# Patient Record
Sex: FEMALE | Race: WHITE | NOT HISPANIC OR LATINO
[De-identification: names, ages, dates, MRNs, and addresses within clinical notes are randomized per-mention and may not be internally consistent; named-entity substitution may affect disease eponyms.]

---

## 2018-07-17 ENCOUNTER — APPOINTMENT (OUTPATIENT)
Dept: SURGICAL ONCOLOGY | Facility: CLINIC | Age: 43
End: 2018-07-17
Payer: MEDICAID

## 2018-07-17 VITALS
SYSTOLIC BLOOD PRESSURE: 94 MMHG | DIASTOLIC BLOOD PRESSURE: 61 MMHG | OXYGEN SATURATION: 100 % | HEART RATE: 76 BPM | BODY MASS INDEX: 20.89 KG/M2 | RESPIRATION RATE: 16 BRPM | WEIGHT: 130 LBS | HEIGHT: 66 IN

## 2018-07-17 DIAGNOSIS — Z86.69 PERSONAL HISTORY OF OTHER DISEASES OF THE NERVOUS SYSTEM AND SENSE ORGANS: ICD-10-CM

## 2018-07-17 DIAGNOSIS — Z86.79 PERSONAL HISTORY OF OTHER DISEASES OF THE CIRCULATORY SYSTEM: ICD-10-CM

## 2018-07-17 DIAGNOSIS — N60.09 SOLITARY CYST OF UNSPECIFIED BREAST: ICD-10-CM

## 2018-07-17 PROCEDURE — 99203 OFFICE O/P NEW LOW 30 MIN: CPT

## 2019-02-27 ENCOUNTER — APPOINTMENT (OUTPATIENT)
Dept: UROGYNECOLOGY | Facility: CLINIC | Age: 44
End: 2019-02-27
Payer: MEDICAID

## 2019-02-27 VITALS
WEIGHT: 130 LBS | HEART RATE: 88 BPM | BODY MASS INDEX: 20.89 KG/M2 | SYSTOLIC BLOOD PRESSURE: 104 MMHG | DIASTOLIC BLOOD PRESSURE: 67 MMHG | HEIGHT: 66 IN

## 2019-02-27 DIAGNOSIS — N36.41 HYPERMOBILITY OF URETHRA: ICD-10-CM

## 2019-02-27 DIAGNOSIS — N39.46 MIXED INCONTINENCE: ICD-10-CM

## 2019-02-27 DIAGNOSIS — R35.0 FREQUENCY OF MICTURITION: ICD-10-CM

## 2019-02-27 LAB
BILIRUB UR QL STRIP: NORMAL
CLARITY UR: CLEAR
COLLECTION METHOD: NORMAL
GLUCOSE UR-MCNC: NORMAL
HCG UR QL: 1 EU/DL
HGB UR QL STRIP.AUTO: NORMAL
KETONES UR-MCNC: 40
LEUKOCYTE ESTERASE UR QL STRIP: NORMAL
NITRITE UR QL STRIP: NORMAL
PH UR STRIP: 7
PROT UR STRIP-MCNC: 30
SP GR UR STRIP: 1.02

## 2019-02-27 PROCEDURE — 51701 INSERT BLADDER CATHETER: CPT

## 2019-02-27 PROCEDURE — 99204 OFFICE O/P NEW MOD 45 MIN: CPT | Mod: 25

## 2019-02-27 NOTE — DISCUSSION/SUMMARY
[Discuss Alternatives/Risks/Benefits w/Patient] : All alternatives, risks, and benefits were discussed with the patient/family and all questions were answered.  Patient expressed good understanding and appreciates the importance of follow up as recommended. [FreeTextEntry1] : 44yo with frequency, FLAVIA, and UUI. Wearing 2 liners/day and 1 at night. She is interested in treating both her frequency and FLAVIA, however, she would like to start with frequency. She was counseled on the diagnosis of OAB and management options, which include observation, lifestyle management and bladder training, medications, and procedures. She was counseled on the diagnosis of FLAVIA and management options including observation, PFMT, Intravaginal devices such as Impressa, and surgery. Due to her mixed complaints, we recommended she undergo UDS testing and fill out a bladder diary. She will then return for discussion of results and to discuss management options. [facility] handouts on OAB ans FLAVIA given. All questions were answered, and she was in agreement with the plan.

## 2019-02-27 NOTE — HISTORY OF PRESENT ILLNESS
[Cystocele (Obstetric)] : none [Rectal Prolapse] : none [Stress Incontinence] : frequent [Unable To Restrain Bowel Movement] : frequent [Urinary Frequency] : none [Feelings Of Urinary Urgency] : daily [x1] : once nightly [Urinary Tract Infection] : daily [] : years ago [Hematuria] : none [Constipation Obstructed Defecation] : none [Stool Visible Blood] : none [Incomplete Emptying Of Stool] : none [de-identified] : with movement during intercourse - leaks large amounts  [de-identified] : 3 [de-identified] : exercise, movement - wears 2 liners/day [de-identified] : 3 [de-identified] : full bladder [de-identified] : 3 [de-identified] : 20x/day [de-identified] : 3 [de-identified] : 3 [de-identified] : 3 [FreeTextEntry1] : 44yo presenting for evaluation for urinary frequency and ELY. Reports frequency is most bothersome. Urinates every 30min-1hr during the day up to 20x/day. Awakes 1-2x/night. Reports FLAVIA is also bothersome as she cannot exercise due to leakage of urine.\par \par PMH: heart murmur - follows with cardiology, not on meds\par PSH: open appendectomy\par Meds: none\par \par Intake: \par 2L H2O\par 4-5c coffee\par Kombucha tea

## 2019-02-27 NOTE — PROCEDURE
[FreeTextEntry1] : Due to history of UUI and FLAVIA, a straight catheterization was performed to assess for infection. The urethral meatus was prepped with betadine. Straight catheter inserted. PVR 5 cc obtained. Pt tolerated well.

## 2019-02-27 NOTE — PHYSICAL EXAM
[No Acute Distress] : in no acute distress [Well developed] : well developed [Well Nourished] : ~L well nourished [Good Hygeine] : demonstrates good hygeine [Oriented x3] : oriented to person, place, and time [Normal Mood/Affect] : mood and affect are normal [Bulbocavernous] : bulbocavernous was present [Anal Reflex] : the anal reflex was present [Warm and Dry] : was warm and dry to touch [Normal Gait] : gait was normal [Labia Majora] : were normal [Labia Minora] : were normal [Normal Appearance] : general appearance was normal [No Bleeding] : there was no active vaginal bleeding [2] : 2 [Uterine Adnexae] : were not tender and not enlarged [Normal] : no abnormalities [Exam Deferred] : was deferred [Anxiety] : patient is not anxious [Tenderness] : ~T no ~M abdominal tenderness observed [Distended] : not distended [H/Smegaly] : no hepatosplenomegaly [Inguinal LAD] : no adenopathy was noted in the inguinal lymph nodes [FreeTextEntry3] : hypermobile  [de-identified] : no prolapse seen

## 2019-03-01 ENCOUNTER — RESULT REVIEW (OUTPATIENT)
Age: 44
End: 2019-03-01

## 2019-03-18 ENCOUNTER — OUTPATIENT (OUTPATIENT)
Dept: OUTPATIENT SERVICES | Facility: HOSPITAL | Age: 44
LOS: 1 days | End: 2019-03-18
Payer: COMMERCIAL

## 2019-03-18 ENCOUNTER — RESULT CHARGE (OUTPATIENT)
Age: 44
End: 2019-03-18

## 2019-03-18 ENCOUNTER — APPOINTMENT (OUTPATIENT)
Dept: UROGYNECOLOGY | Facility: CLINIC | Age: 44
End: 2019-03-18
Payer: MEDICAID

## 2019-03-18 DIAGNOSIS — Z01.818 ENCOUNTER FOR OTHER PREPROCEDURAL EXAMINATION: ICD-10-CM

## 2019-03-18 LAB
BILIRUB UR QL STRIP: NORMAL
CLARITY UR: CLEAR
COLLECTION METHOD: NORMAL
GLUCOSE UR-MCNC: NORMAL
HCG UR QL: 0.2 EU/DL
HGB UR QL STRIP.AUTO: NORMAL
KETONES UR-MCNC: NORMAL
LEUKOCYTE ESTERASE UR QL STRIP: NORMAL
NITRITE UR QL STRIP: NORMAL
PH UR STRIP: 7.5
PROT UR STRIP-MCNC: NORMAL
SP GR UR STRIP: 1.01

## 2019-03-18 PROCEDURE — 51784 ANAL/URINARY MUSCLE STUDY: CPT

## 2019-03-18 PROCEDURE — 51797 INTRAABDOMINAL PRESSURE TEST: CPT | Mod: 26

## 2019-03-18 PROCEDURE — 51797 INTRAABDOMINAL PRESSURE TEST: CPT

## 2019-03-18 PROCEDURE — 51729 CYSTOMETROGRAM W/VP&UP: CPT | Mod: 26

## 2019-03-18 PROCEDURE — 51784 ANAL/URINARY MUSCLE STUDY: CPT | Mod: 26

## 2019-03-18 PROCEDURE — 51729 CYSTOMETROGRAM W/VP&UP: CPT

## 2019-03-19 DIAGNOSIS — N32.81 OVERACTIVE BLADDER: ICD-10-CM

## 2019-03-19 DIAGNOSIS — N39.3 STRESS INCONTINENCE (FEMALE) (MALE): ICD-10-CM

## 2019-03-20 ENCOUNTER — APPOINTMENT (OUTPATIENT)
Dept: UROGYNECOLOGY | Facility: CLINIC | Age: 44
End: 2019-03-20

## 2019-05-15 ENCOUNTER — APPOINTMENT (OUTPATIENT)
Dept: UROGYNECOLOGY | Facility: CLINIC | Age: 44
End: 2019-05-15
Payer: MEDICAID

## 2019-05-15 PROCEDURE — 99213 OFFICE O/P EST LOW 20 MIN: CPT

## 2019-05-15 NOTE — HISTORY OF PRESENT ILLNESS
[FreeTextEntry1] : Returns today for followup on her urinary incontinence. Her chart was reviewed. Review of symptoms unchanged from initial visit. On initial visit she had complaints of urgency with incontinence as well as urinary frequency. Her chart was reviewed. 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 A reviewed these findings with her. She stated that her to dominant urinary complaint today is the urge incontinence and urinary frequency and we discussed treating the detrusor instability and overactive bladder first. We discussed treatment options and she would like to time medication. Side effects were discussed. We also discussed behavioral modification techniques. I've asked to followup in the office in approximately one month to evaluate her response. She did not bring with her her urologic as she stated she measured on shabbat and didn't record the values but I asked her to bring a urologic with her on her next visit in order to assess how she is doing on the oxybutynin. All questions were answered. IUGA pt info on Kegels given

## 2019-06-17 ENCOUNTER — APPOINTMENT (OUTPATIENT)
Dept: UROGYNECOLOGY | Facility: CLINIC | Age: 44
End: 2019-06-17
Payer: MEDICAID

## 2019-06-17 VITALS — DIASTOLIC BLOOD PRESSURE: 80 MMHG | HEART RATE: 84 BPM | SYSTOLIC BLOOD PRESSURE: 117 MMHG

## 2019-06-17 DIAGNOSIS — N32.81 OVERACTIVE BLADDER: ICD-10-CM

## 2019-06-17 DIAGNOSIS — N39.3 STRESS INCONTINENCE (FEMALE) (MALE): ICD-10-CM

## 2019-06-17 PROCEDURE — 99213 OFFICE O/P EST LOW 20 MIN: CPT

## 2019-06-17 RX ORDER — OXYBUTYNIN CHLORIDE 10 MG/1
10 TABLET, EXTENDED RELEASE ORAL
Qty: 30 | Refills: 1 | Status: DISCONTINUED | COMMUNITY
Start: 2019-05-15 | End: 2019-06-17

## 2019-06-17 NOTE — HISTORY OF PRESENT ILLNESS
[Urge Incontinence Of Urine] : none [Stress Incontinence] : none [Urinary Frequency] : none [Unable To Restrain Bowel Movement] : frequent [de-identified] : sometimes [de-identified] : wears a panty liner [de-identified] : 0-1 [FreeTextEntry1] : Pt took Oxybutynin and it caused nausea, however it did help with  urge incontinence. Her chart was reviewed.  Discussed tx options pt will try another medication.\par Will start Myrbetriq.  SE discussed. F/u 1 month\par All questions answered. \par

## 2019-06-18 RX ORDER — TROSPIUM CHLORIDE 60 MG/1
60 CAPSULE, EXTENDED RELEASE ORAL DAILY
Qty: 30 | Refills: 2 | Status: ACTIVE | COMMUNITY
Start: 2019-06-18 | End: 1900-01-01

## 2019-06-18 RX ORDER — MIRABEGRON 50 MG/1
50 TABLET, FILM COATED, EXTENDED RELEASE ORAL
Qty: 30 | Refills: 1 | Status: DISCONTINUED | COMMUNITY
Start: 2019-06-17 | End: 2019-06-18

## 2019-06-19 ENCOUNTER — MESSAGE (OUTPATIENT)
Age: 44
End: 2019-06-19

## 2019-07-15 ENCOUNTER — APPOINTMENT (OUTPATIENT)
Dept: UROGYNECOLOGY | Facility: CLINIC | Age: 44
End: 2019-07-15

## 2019-11-21 LAB
APPEARANCE: CLEAR
BACTERIA UR CULT: NORMAL
BACTERIA: NEGATIVE
BILIRUBIN URINE: NEGATIVE
BLOOD URINE: NORMAL
COLOR: YELLOW
GLUCOSE QUALITATIVE U: NEGATIVE
HYALINE CASTS: 0 /LPF
KETONES URINE: NORMAL
LEUKOCYTE ESTERASE URINE: NEGATIVE
MICROSCOPIC-UA: NORMAL
NITRITE URINE: NEGATIVE
PH URINE: 6.5
PROTEIN URINE: ABNORMAL
RED BLOOD CELLS URINE: 4 /HPF
SPECIFIC GRAVITY URINE: 1.03
SQUAMOUS EPITHELIAL CELLS: 7 /HPF
UROBILINOGEN URINE: NORMAL
WHITE BLOOD CELLS URINE: 5 /HPF

## 2020-05-18 ENCOUNTER — LABORATORY RESULT (OUTPATIENT)
Age: 45
End: 2020-05-18

## 2020-05-18 ENCOUNTER — APPOINTMENT (OUTPATIENT)
Dept: DERMATOLOGY | Facility: CLINIC | Age: 45
End: 2020-05-18
Payer: MEDICAID

## 2020-05-18 ENCOUNTER — APPOINTMENT (OUTPATIENT)
Dept: DERMATOLOGY | Facility: CLINIC | Age: 45
End: 2020-05-18

## 2020-05-18 VITALS — BODY MASS INDEX: 22.5 KG/M2 | HEIGHT: 66 IN | WEIGHT: 140 LBS

## 2020-05-18 DIAGNOSIS — L85.3 XEROSIS CUTIS: ICD-10-CM

## 2020-05-18 DIAGNOSIS — D18.00 HEMANGIOMA UNSPECIFIED SITE: ICD-10-CM

## 2020-05-18 DIAGNOSIS — Z12.83 ENCOUNTER FOR SCREENING FOR MALIGNANT NEOPLASM OF SKIN: ICD-10-CM

## 2020-05-18 PROCEDURE — 99203 OFFICE O/P NEW LOW 30 MIN: CPT | Mod: GC,25

## 2020-05-18 PROCEDURE — 11102 TANGNTL BX SKIN SINGLE LES: CPT | Mod: GC

## 2020-05-18 RX ORDER — TRIAMCINOLONE ACETONIDE 1 MG/G
0.1 OINTMENT TOPICAL TWICE DAILY
Qty: 1 | Refills: 0 | Status: ACTIVE | COMMUNITY
Start: 2020-05-18 | End: 1900-01-01

## 2020-05-27 ENCOUNTER — EMERGENCY (EMERGENCY)
Facility: HOSPITAL | Age: 45
LOS: 1 days | Discharge: HOME | End: 2020-05-27
Attending: EMERGENCY MEDICINE
Payer: MEDICAID

## 2020-05-27 VITALS
OXYGEN SATURATION: 100 % | RESPIRATION RATE: 17 BRPM | HEART RATE: 73 BPM | SYSTOLIC BLOOD PRESSURE: 115 MMHG | DIASTOLIC BLOOD PRESSURE: 79 MMHG | TEMPERATURE: 99 F

## 2020-05-27 VITALS
WEIGHT: 149.91 LBS | SYSTOLIC BLOOD PRESSURE: 108 MMHG | RESPIRATION RATE: 17 BRPM | DIASTOLIC BLOOD PRESSURE: 69 MMHG | HEIGHT: 66 IN | OXYGEN SATURATION: 98 % | TEMPERATURE: 98 F | HEART RATE: 88 BPM

## 2020-05-27 DIAGNOSIS — T17.208A UNSPECIFIED FOREIGN BODY IN PHARYNX CAUSING OTHER INJURY, INITIAL ENCOUNTER: ICD-10-CM

## 2020-05-27 PROCEDURE — 99284 EMERGENCY DEPT VISIT MOD MDM: CPT

## 2020-05-27 PROCEDURE — 70360 X-RAY EXAM OF NECK: CPT | Mod: 26

## 2020-05-27 PROCEDURE — 70360 X-RAY EXAM OF NECK: CPT

## 2020-05-27 PROCEDURE — ZZZZZ: CPT

## 2020-05-27 PROCEDURE — 99285 EMERGENCY DEPT VISIT HI MDM: CPT | Mod: 25

## 2020-05-27 PROCEDURE — 31575 DIAGNOSTIC LARYNGOSCOPY: CPT

## 2020-05-27 NOTE — ED PROVIDER NOTE - OBJECTIVE STATEMENT
47y F no PMHx was eating fish 2hr ago and got bone stuck in throat and now has pain. Has been coughing, trying to cough up bone unsuccessfully. No sob, n/v. Has pain when swallowing, and odynophagia.

## 2020-05-27 NOTE — CONSULT NOTE ADULT - ASSESSMENT
47y F no PMHx was eating fish 2hr ago and got bone stuck in throat and now has pain. Has been coughing, trying to cough up bone unsuccessfully. No sob, n/v. Has pain when swallowing, and odynophagia. ENT consulted for foreign body sensation and upper airway evaluation. Laryngoscopy done, bone noted at the base of tongue. Removed with at bedside with ED attending.

## 2020-05-27 NOTE — ED PROVIDER NOTE - PROGRESS NOTE DETAILS
Luiz York, PGY 2: ENT at bedside using alligator and nasal scope, 3cm bone was removed from the base of the tongue Luiz York, PGY 2: ENT at bedside using alligator and nasal scope, 3cm bone was removed from the base of the tongue.  See procedure note for details.

## 2020-05-27 NOTE — ED PROVIDER NOTE - PHYSICAL EXAMINATION
GEN: No fever, no chills  HEENT: No bleeding, no foreign body  CHEST/LUNGS: Non-tachypneic, CTAB, bilateral breath sounds  CARDIAC: Non-tachycardic, normal perfusion  ABDOMEN: No abd pain, no crepitus\  MSK: no midline tenderness

## 2020-05-27 NOTE — ED ADULT NURSE NOTE - NSIMPLEMENTINTERV_GEN_ALL_ED
17-Sep-2018 19:31 Implemented All Universal Safety Interventions:  [place] to call system. Call bell, personal items and telephone within reach. Instruct patient to call for assistance. Room bathroom lighting operational. Non-slip footwear when patient is off stretcher. Physically safe environment: no spills, clutter or unnecessary equipment. Stretcher in lowest position, wheels locked, appropriate side rails in place.

## 2020-05-27 NOTE — CONSULT NOTE ADULT - SUBJECTIVE AND OBJECTIVE BOX
CC: foreign body     HPI: 47y F no PMHx was eating fish 2hr ago and got bone stuck in throat and now has pain. Has been coughing, trying to cough up bone unsuccessfully. No sob, n/v. Has pain when swallowing, and odynophagia. ENT consulted for foreign body sensation and upper airway evaluation.     PAST MEDICAL & SURGICAL HISTORY:    Allergies    No Known Allergies    Intolerances      MEDICATIONS  (STANDING):    MEDICATIONS  (PRN):      Social History: nonsmoker     Family history: no pertinent family history     ROS:   ENT: all negative except as noted in HPI   CV: denies palpitations  Pulm: denies SOB, cough, hemoptysis  GI: denies change in apetite, indigestion, n/v  : denies pertinent urinary symptoms, urgency  Neuro: denies numbness/tingling, loss of sensation  Psych: denies anxiety  MS: denies muscle weakness, instability  Heme: denies easy bruising or bleeding  Endo: denies heat/cold intolerance, excessive sweating  Vascular: denies LE edema    Vital Signs Last 24 Hrs  T(C): 36.7 (27 May 2020 19:05), Max: 36.7 (27 May 2020 16:54)  T(F): 98 (27 May 2020 19:05), Max: 98.1 (27 May 2020 16:54)  HR: 80 (27 May 2020 19:05) (80 - 88)  BP: 112/74 (27 May 2020 19:05) (108/69 - 112/74)  BP(mean): --  RR: 18 (27 May 2020 19:05) (17 - 18)  SpO2: 97% (27 May 2020 19:05) (97% - 98%)              PHYSICAL EXAM:  Gen: NAD  Skin: No rashes, bruises, or lesions  Head: Normocephalic, Atraumatic  Face: no edema, erythema, or fluctuance. Parotid glands soft without mass  Eyes: no scleral injection  Nose: Nares bilaterally patent, no discharge  Mouth: No Stridor / Drooling / Trismus.  Mucosa moist, tongue/uvula midline, oropharynx clear  Neck: Flat, supple, no lymphadenopathy, trachea midline, no masses  Lymphatic: No lymphadenopathy  Resp: breathing easily, no stridor  CV: no peripheral edema/cyanosis  GI: nondistended   Peripheral vascular: no JVD or edema  Neuro: facial nerve intact, no facial droop      Fiberoptic Indirect laryngoscopy:  (Scope #2 used): Reason for Laryngoscopy:      Nasopharynx, oropharynx, and hypopharynx clear, no bleeding. Posterior pharyngeal wall, vallecula, epiglottis, and subglottis appear normal. No erythema, edema, pooling of secretions, masses or lesions. Airway patent, no foreign body visualized. No glottic/supraglottic edema. True vocal cords, arytenoids, vestibular folds, ventricles, pyriform sinuses, and aryepiglottic folds appear normal bilaterally. Vocal cords mobile with good contact b/l. BASE OF TONGUE WITH FISHBONE               IMAGING/ADDITIONAL STUDIES:

## 2020-05-27 NOTE — ED ADULT NURSE NOTE - OBJECTIVE STATEMENT
Patient was eating fish and feels like the bone is stuck in her throat. Patient was eating at about 1600. No acute respiratory distress at this time.

## 2020-05-27 NOTE — ED PROVIDER NOTE - CLINICAL SUMMARY MEDICAL DECISION MAKING FREE TEXT BOX
Luiz York): Possible foreign objet after eating fish today. Concerning for bone in throat. Will get XR. No sign of airway compromise and no active hematemesis or hemoptysis Luiz York): Possible foreign objet after eating fish today. Concerning for bone in throat. Will get XR. No sign of airway compromise and no active hematemesis or hemoptysis    Attending Statement: Agree with the above.  Likely FB (fishbone) in oropharynx.  Exam as above; no evidence of or concerns for impending airway compromise.  Screening XR for pneumomediastinum, ENT for assistance c retrieval.  Reassess.  --BMM

## 2020-05-27 NOTE — ED PROVIDER NOTE - PATIENT PORTAL LINK FT
You can access the FollowMyHealth Patient Portal offered by [facility] by registering at the following website: [email or web address]. By joining [facility]’s FollowMyHealth portal, you will also be able to view your health information using other applications (apps) compatible with our system.

## 2020-05-27 NOTE — ED PROVIDER NOTE - NSFOLLOWUPINSTRUCTIONS_ED_ALL_ED_FT
Thank you for visiting our Emergency Department, it has been a pleasure taking part in your healthcare. Please follow up with your primary doctor within x48 hours.    Your discharge diagnosis is: fish bone in the throat   please take over the counter pain medication such as motrin (600mg every 6hours) and tylenol (650mg every 4hours) for pain and follow up with your primary care doctor.     Return precautions to the Emergency Department include but are not limited to: unrelenting nausea, vomiting, fever, chills, chest pain, shortness of breath, dizziness, abdominal pain, worsening pain, syncope, blood in urine or stool, headache that doesn't resolve, numbness or tingling, loss of sensation, loss of motor function, or any other concerning symptoms.

## 2020-05-27 NOTE — ED PROVIDER NOTE - ATTESTATION, MLM
I have reviewed and confirmed nurses' notes for patient's medications, allergies, medical history, and surgical history.
no chest pain and no edema.

## 2020-11-16 ENCOUNTER — LABORATORY RESULT (OUTPATIENT)
Age: 45
End: 2020-11-16

## 2020-11-16 ENCOUNTER — APPOINTMENT (OUTPATIENT)
Dept: DERMATOLOGY | Facility: CLINIC | Age: 45
End: 2020-11-16
Payer: MEDICAID

## 2020-11-16 VITALS — WEIGHT: 130 LBS | BODY MASS INDEX: 20.89 KG/M2 | HEIGHT: 66 IN

## 2020-11-16 DIAGNOSIS — Z12.83 ENCOUNTER FOR SCREENING FOR MALIGNANT NEOPLASM OF SKIN: ICD-10-CM

## 2020-11-16 DIAGNOSIS — Z86.03 PERSONAL HISTORY OF NEOPLASM OF UNCERTAIN BEHAVIOR: ICD-10-CM

## 2020-11-16 DIAGNOSIS — D48.5 NEOPLASM OF UNCERTAIN BEHAVIOR OF SKIN: ICD-10-CM

## 2020-11-16 DIAGNOSIS — D23.9 OTHER BENIGN NEOPLASM OF SKIN, UNSPECIFIED: ICD-10-CM

## 2020-11-16 DIAGNOSIS — Z86.018 PERSONAL HISTORY OF OTHER BENIGN NEOPLASM: ICD-10-CM

## 2020-11-16 PROCEDURE — 99213 OFFICE O/P EST LOW 20 MIN: CPT | Mod: 25

## 2020-11-16 PROCEDURE — 99072 ADDL SUPL MATRL&STAF TM PHE: CPT

## 2020-11-16 PROCEDURE — 11102 TANGNTL BX SKIN SINGLE LES: CPT

## 2020-11-19 ENCOUNTER — NON-APPOINTMENT (OUTPATIENT)
Age: 45
End: 2020-11-19

## 2021-12-15 PROBLEM — Z78.9 OTHER SPECIFIED HEALTH STATUS: Chronic | Status: ACTIVE | Noted: 2020-05-27

## 2022-01-05 ENCOUNTER — APPOINTMENT (OUTPATIENT)
Dept: UROGYNECOLOGY | Facility: CLINIC | Age: 47
End: 2022-01-05

## 2023-04-17 ENCOUNTER — APPOINTMENT (OUTPATIENT)
Dept: DERMATOLOGY | Facility: CLINIC | Age: 48
End: 2023-04-17

## 2023-04-25 ENCOUNTER — NON-APPOINTMENT (OUTPATIENT)
Age: 48
End: 2023-04-25

## 2023-04-25 ENCOUNTER — APPOINTMENT (OUTPATIENT)
Dept: DERMATOLOGY | Facility: CLINIC | Age: 48
End: 2023-04-25

## 2023-05-17 ENCOUNTER — APPOINTMENT (OUTPATIENT)
Dept: DERMATOLOGY | Facility: CLINIC | Age: 48
End: 2023-05-17

## 2023-05-24 ENCOUNTER — APPOINTMENT (OUTPATIENT)
Dept: OTOLARYNGOLOGY | Facility: CLINIC | Age: 48
End: 2023-05-24

## 2023-08-29 ENCOUNTER — APPOINTMENT (OUTPATIENT)
Dept: DERMATOLOGY | Facility: CLINIC | Age: 48
End: 2023-08-29
Payer: MEDICAID

## 2023-08-29 DIAGNOSIS — Q82.5 CONGENITAL NON-NEOPLASTIC NEVUS: ICD-10-CM

## 2023-08-29 DIAGNOSIS — D22.9 MELANOCYTIC NEVI, UNSPECIFIED: ICD-10-CM

## 2023-08-29 DIAGNOSIS — D18.01 HEMANGIOMA OF SKIN AND SUBCUTANEOUS TISSUE: ICD-10-CM

## 2023-08-29 DIAGNOSIS — L81.4 OTHER MELANIN HYPERPIGMENTATION: ICD-10-CM

## 2023-08-29 DIAGNOSIS — D23.9 OTHER BENIGN NEOPLASM OF SKIN, UNSPECIFIED: ICD-10-CM

## 2023-08-29 PROCEDURE — 99213 OFFICE O/P EST LOW 20 MIN: CPT

## 2023-08-30 NOTE — PHYSICAL EXAM
[Alert] : alert [Well Nourished] : well nourished [Full Body Skin Exam Performed] : performed [FreeTextEntry3] : deferred groin exam scattered red papules on trunk and extremities Fairly uniform and regular brown macules and papules on the trunk and extremities light brown macules scattered on trunk and extremities medial to R superior eyelid, skin colored papule R cheek with skin colored papule and central pigment R nipple with melanocytic macule, uniform in size, shape, and color L lateral thigh with firm nodule with overlying pigment

## 2023-08-30 NOTE — HISTORY OF PRESENT ILLNESS
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 not sure, still alive - goes into sun a lot, wears sunscreen when in sun

## 2023-08-30 NOTE — ASSESSMENT
[FreeTextEntry1] : #Nevi, compound near R eye, melanocytic nevus near R nipple #Dermatofibroma, L thigh #Cherry angioma #Solar lentigines - Counseled about clinically benign lesions - Discussed regular OTC sunscreen use, SPF 30 or higher   Skin cancer Screening - No lesions clinically concerning for malignancy today - Discussed regular self skin checks and ABCDEs of skin cancer screening - Discussed regular sunscreen use - Pt instructed to report any new or changing lesions  RTC 1 yr for FBSE or sooner if any concerns

## 2024-07-17 ENCOUNTER — APPOINTMENT (OUTPATIENT)
Dept: OTOLARYNGOLOGY | Facility: CLINIC | Age: 49
End: 2024-07-17
Payer: MEDICAID

## 2024-07-17 VITALS
HEART RATE: 85 BPM | HEIGHT: 66 IN | DIASTOLIC BLOOD PRESSURE: 84 MMHG | SYSTOLIC BLOOD PRESSURE: 116 MMHG | WEIGHT: 140 LBS | BODY MASS INDEX: 22.5 KG/M2

## 2024-07-17 DIAGNOSIS — J32.9 CHRONIC SINUSITIS, UNSPECIFIED: ICD-10-CM

## 2024-07-17 DIAGNOSIS — J30.9 ALLERGIC RHINITIS, UNSPECIFIED: ICD-10-CM

## 2024-07-17 PROCEDURE — 99244 OFF/OP CNSLTJ NEW/EST MOD 40: CPT

## 2024-07-22 ENCOUNTER — APPOINTMENT (OUTPATIENT)
Dept: CT IMAGING | Facility: IMAGING CENTER | Age: 49
End: 2024-07-22
Payer: MEDICAID

## 2024-07-22 PROCEDURE — 70486 CT MAXILLOFACIAL W/O DYE: CPT | Mod: 26

## 2024-07-25 ENCOUNTER — TRANSCRIPTION ENCOUNTER (OUTPATIENT)
Age: 49
End: 2024-07-25

## 2024-07-25 ENCOUNTER — NON-APPOINTMENT (OUTPATIENT)
Age: 49
End: 2024-07-25

## 2024-07-25 ENCOUNTER — APPOINTMENT (OUTPATIENT)
Dept: ALLERGY | Facility: CLINIC | Age: 49
End: 2024-07-25
Payer: MEDICAID

## 2024-07-25 VITALS
TEMPERATURE: 98.3 F | BODY MASS INDEX: 22.5 KG/M2 | SYSTOLIC BLOOD PRESSURE: 114 MMHG | OXYGEN SATURATION: 98 % | HEIGHT: 66 IN | WEIGHT: 140 LBS | DIASTOLIC BLOOD PRESSURE: 72 MMHG | HEART RATE: 90 BPM

## 2024-07-25 PROCEDURE — 99204 OFFICE O/P NEW MOD 45 MIN: CPT | Mod: 25

## 2024-07-25 PROCEDURE — 95004 PERQ TESTS W/ALRGNC XTRCS: CPT

## 2024-07-25 NOTE — SOCIAL HISTORY
[House] : [unfilled] lives in a house  [Central] : air conditioning provided by central unit [Radiator/Baseboard] : heating provided by radiator(s)/baseboard(s) [Dog] : dog [Cat] : cat [] :  [Smokers in Household] : there are smokers in the home [FreeTextEntry1] : Homemaker Lives with spouse and 3 children  [Bedroom] : not in the bedroom [Basement] : not in the basement [Living Area] : not in the living area [de-identified] :  sometimes in the house

## 2024-07-25 NOTE — PHYSICAL EXAM
[Alert] : alert [Well Nourished] : well nourished [Healthy Appearance] : healthy appearance [No Acute Distress] : no acute distress [Well Developed] : well developed [Normal Voice/Communication] : normal voice communication [Normal Nasal Mucosa] : the nasal mucosa was normal [No Neck Mass] : no neck mass was observed [No LAD] : no lymphadenopathy [No Thyroid Mass] : no thyroid mass [Normal Rate and Effort] : normal respiratory rhythm and effort [No Crackles] : no crackles [No Retractions] : no retractions [Normal Rate] : heart rate was normal  [Normal S1, S2] : normal S1 and S2 [Regular Rhythm] : with a regular rhythm [Normal Mood] : mood was normal [Judgment and Insight Age Appropriate] : judgement and insight is age appropriate [Wheezing] : no wheezing was heard

## 2024-07-25 NOTE — HISTORY OF PRESENT ILLNESS
[de-identified] : Patient with long history of nasal congestion - head pain - jaw pain - eye pain - constant mucus from her nose - sometimes discolored   She had sinus surgery about 18 years ago - DNS repair.   No improvement in her symptoms.   She is treated with antibiotics every few months.  She uses Nasonex daily - she will add a saline rinse only when she has facial pain.    Patient grew up in [place] - [place] 19.     No history of asthma.   Sinus CT - DNS - bilateral jaimie bullosa - mild inflammation in sinuses

## 2024-07-25 NOTE — ASSESSMENT
[FreeTextEntry1] : Recurrent sinusitis  DNS Bilateral jaimie bullosa   No significant environmental allergies resulting in her chronic congestion/recurrent sinusitis  Hypertonic saline on regular basis in AM followed by Nasonex 2 puffs QD each nostril  Follow up with Dr. Miller

## 2024-07-25 NOTE — IMPRESSION
[_____] : weeds ([unfilled]) [Allergy Testing Dust Mite] : dust mites [Allergy Testing Cockroach] : cockroach [Allergy Testing Dog] : dog [Allergy Testing Cat] : cat [] : molds [Allergy Testing Trees] : trees [Allergy Testing Grasses] : grasses

## 2024-08-01 ENCOUNTER — TRANSCRIPTION ENCOUNTER (OUTPATIENT)
Age: 49
End: 2024-08-01

## 2024-08-02 RX ORDER — FLUTICASONE PROPIONATE 50 UG/1
50 SPRAY, METERED NASAL
Qty: 1 | Refills: 3 | Status: ACTIVE | COMMUNITY
Start: 2024-08-02 | End: 1900-01-01

## 2024-08-20 ENCOUNTER — APPOINTMENT (OUTPATIENT)
Dept: OTOLARYNGOLOGY | Facility: CLINIC | Age: 49
End: 2024-08-20
Payer: MEDICAID

## 2024-08-20 VITALS
BODY MASS INDEX: 22.5 KG/M2 | HEART RATE: 87 BPM | HEIGHT: 66 IN | SYSTOLIC BLOOD PRESSURE: 111 MMHG | WEIGHT: 140 LBS | OXYGEN SATURATION: 98 % | DIASTOLIC BLOOD PRESSURE: 77 MMHG

## 2024-08-20 DIAGNOSIS — J34.89 OTHER SPECIFIED DISORDERS OF NOSE AND NASAL SINUSES: ICD-10-CM

## 2024-08-20 DIAGNOSIS — J30.9 ALLERGIC RHINITIS, UNSPECIFIED: ICD-10-CM

## 2024-08-20 DIAGNOSIS — J31.0 CHRONIC RHINITIS: ICD-10-CM

## 2024-08-20 PROCEDURE — 99214 OFFICE O/P EST MOD 30 MIN: CPT

## 2024-08-20 NOTE — CONSULT LETTER
[Dear  ___] : Dear  [unfilled], [Consult Letter:] : I had the pleasure of evaluating your patient, [unfilled]. [Please see my note below.] : Please see my note below. [Consult Closing:] : Thank you very much for allowing me to participate in the care of this patient.  If you have any questions, please do not hesitate to contact me. [Sincerely,] : Sincerely, [FreeTextEntry2] : Domo Johnson MD [FreeTextEntry3] : Sav Miller MD, FACS Clinical  Dept. of Otolaryngology [facility]  [DrRosemarie  ___] : Dr. RIVERA

## 2024-08-20 NOTE — DATA REVIEWED
[de-identified] : IMAGES REVIEWED:  EXAM: [id number] - CT SINUSES  - ORDERED BY: GABRIELLA LANGFORD   PROCEDURE DATE:  07/22/2024    INTERPRETATION:  CLINICAL INFORMATION: : Recurrent acute sinusitis.;. ICT. Ordering Dxs: J32.9 Chronic sinusitis, unspecified / J30.9 Allergic rhinitis, unspecified /// Admitting Dxs: J32.9 CHRONIC SINUSITIS, UNSPECIFIED / J30.9 ALLERGIC RHINITIS, UNSPECIFIED  TECHNIQUE: Thin section axial CT imaging of the sinuses was performed with a [facility] protocol. Sagittal and coronal reformations were generated from the thin section axial data.  COMPARISON: No similar prior studies available for comparison.  FINDINGS:  FRONTAL SINUSES:  Normal. FRONTAL RECESSES: Patent. ETHMOID SINUSES:  Normal. MAXILLARY SINUSES:  Trace bilateral maxillary sinus mucosal thickening. Left maxillary sinus wall thickening suggesting sequela of chronic sinusitis. RIGHT OSTEOMEATAL UNIT:  Patent. LEFT OSTEOMEATAL UNIT:  Left maxillary antrostomy/uncinectomy. Patent neoostium. SPHENOID SINUSES:  Normal. SPHENOETHMOIDAL RECESSES:  Patent.  NASAL SEPTUM:  Leftward nasal septal deviation. NASAL CAVITY/NASOPHARYNX:  Bilateral jaimie bullosa. BONES:  The bones surrounding the paranasal sinuses are intact. INTRACRANIAL STRUCTURES:  Evaluation of the intracranial contents is limited with sinus CT technique. ORBITAL CONTENTS:  Normal.  IMPRESSION: 1.  Trace bilateral maxillary sinus mucosal thickening. Left maxillary sinus wall thickening suggesting sequela of chronic sinusitis. 2.  Left maxillary antrostomy/uncinectomy. Patent neoostium. 3.  Leftward nasal septal deviation.  --- End of Report ---       PUSHPA TORRES MD; Attending Radiologist This document has been electronically signed. Jul 25 2024 11:13AM [de-identified] : Allergy testing - Only + for mugwort

## 2024-08-20 NOTE — HISTORY OF PRESENT ILLNESS
[de-identified] : 48 year old female with history of recurrent sinusitis s/p septoplasty and sinus surgery (20 years ago). Presents today to discuss surgery. Patient reports recurrent sinus infections, 7 in the last year, requiring antibiotics. Reports continued nasal congestion, dry mucus and sinus pressure. Using Flonase Seen by Dr. Boxer, advised to use saline rinses. Right ear pressure resolved. Notices occasional coughing with drinking liquids. No difficulty with solids. Denies odynophagia.

## 2024-08-20 NOTE — PHYSICAL EXAM
[] : septum deviated to the right [de-identified] : b/l IT edema [Midline] : trachea located in midline position [Normal] : no rashes

## 2024-08-20 NOTE — ASSESSMENT
[FreeTextEntry1] : Pt to use Fluticasone and will add Claritin daily.  If no better, she will return to be considered for turbinate reduction and removal of jaimie bullosa.

## 2024-09-05 ENCOUNTER — APPOINTMENT (OUTPATIENT)
Dept: DERMATOLOGY | Facility: CLINIC | Age: 49
End: 2024-09-05
Payer: MEDICAID

## 2024-09-05 DIAGNOSIS — L30.4 ERYTHEMA INTERTRIGO: ICD-10-CM

## 2024-09-05 DIAGNOSIS — D23.9 OTHER BENIGN NEOPLASM OF SKIN, UNSPECIFIED: ICD-10-CM

## 2024-09-05 DIAGNOSIS — Z12.83 ENCOUNTER FOR SCREENING FOR MALIGNANT NEOPLASM OF SKIN: ICD-10-CM

## 2024-09-05 DIAGNOSIS — D22.9 MELANOCYTIC NEVI, UNSPECIFIED: ICD-10-CM

## 2024-09-05 DIAGNOSIS — D48.5 NEOPLASM OF UNCERTAIN BEHAVIOR OF SKIN: ICD-10-CM

## 2024-09-05 PROCEDURE — 99214 OFFICE O/P EST MOD 30 MIN: CPT | Mod: 25

## 2024-09-05 PROCEDURE — 11102 TANGNTL BX SKIN SINGLE LES: CPT

## 2024-09-05 RX ORDER — NYSTATIN 100000 1/G
100000 POWDER TOPICAL
Qty: 1 | Refills: 2 | Status: ACTIVE | COMMUNITY
Start: 2024-09-05 | End: 1900-01-01

## 2024-09-05 RX ORDER — HYDROCORTISONE 25 MG/G
2.5 OINTMENT TOPICAL
Qty: 1 | Refills: 3 | Status: ACTIVE | COMMUNITY
Start: 2024-09-05 | End: 1900-01-01

## 2024-09-05 NOTE — HISTORY OF PRESENT ILLNESS
[FreeTextEntry1] : f/u - spots [de-identified] : 47 y/o F here for spots and wants full body check - 1 spot near R nipple, born with it she wants checked - new itchy rash in groin - mom had skin cancer, melanoma? not sure, still alive - goes into sun a lot, wears sunscreen when in sun

## 2024-09-05 NOTE — HISTORY OF PRESENT ILLNESS
[FreeTextEntry1] : f/u - spots [de-identified] : 47 y/o F here for spots and wants full body check - 1 spot near R nipple, born with it she wants checked - new itchy rash in groin - mom had skin cancer, melanoma? not sure, still alive - goes into sun a lot, wears sunscreen when in sun

## 2024-09-05 NOTE — PHYSICAL EXAM
[Well Nourished] : well nourished [Full Body Skin Exam Performed] : performed [Oriented x 3] : ~L oriented x 3 [Conjunctiva Non-injected] : conjunctiva non-injected [No Visual Lymphadenopathy] : no visual  lymphadenopathy [No Clubbing] : no clubbing [No Edema] : no edema [No Bromhidrosis] : no bromhidrosis [No Chromhidrosis] : no chromhidrosis [Alert] : alert [FreeTextEntry3] : PE:  General: well-appearing, alert, in no acute distress  Full body skin exam performed examining scalp, head, face, ears, eyes, mouth, neck, chest, back, abdomen, axilla, b/l arms, b/l forearms, b/l hands, b/l fingernails, b/l thighs, b/l legs, b/l feet, b/l toenails. Buttocks and genitalia deferred per patient. Pertinent findings include: scattered red papules on trunk and extremities Fairly uniform and regular brown macules and papules on the trunk and extremities light brown macules scattered on trunk and extremities R lateral thigh w dark brown papule w starburst pattern on dermoscopy R nipple with melanocytic macule, uniform in size, shape, and color L lateral leg with firm hyperpigmented nodule with central scar

## 2024-09-05 NOTE — ASSESSMENT
[FreeTextEntry1] : #Neoplasm of Uncertain Behavior - site: R lateral thigh - ddx: spitz/atypical nevus vs other - shave bx of lesion performed today risks/benefits/alternatives to shave biopsy discussed today and patient consented to procedure. time out performed. lidocaine with epinephrine used for anesthesia. shave biopsy performed. patient tolerated procedure well without complications. aftercare instructions provided to patient. will call pt w biopsy results.  #Nevi, compound near R eye, melanocytic nevus near R nipple #Dermatofibroma, L thigh - pt counseled on benign nature and natural progression of this condition - abcdes reviewed, rtc sooner if changing  #Intertrigo, medial thighs chronic flaring - education and counseling - Start HC 2.5% oint mixed BID to AA PRN itching, flares. Do not use for more than 2 weeks at a time, with 1 week off in between. SED including skin thinning, atrophy, stretch marks, steroid acne. - Can stop HC when no longer inflamed and use Nystatin powder or zinc oxide for maintenance.  FBSE/Healthcare maintenance -Sun protection was discussed. The proper use of broad-spectrum UVA/UVB sunscreens with SPF 30 or greater was reviewed and the need for re-application after swimming or sweating or 2-3 hours was emphasized. We talked about judicious use of clothing and avoidance of peak periods of sun exposure. I made the patient aware of the need for year-round protection. ABCDEs of melanoma were also reviewed. -Self-skin checks were also reviewed, rtc sooner if changed noted -Counseled patient to monitor for changes - FBSE completed today, 1 bx as above, next fbse pending results  RTC pending results above, likely fbse in 1 year or sooner prn

## 2024-09-16 RX ORDER — MUPIROCIN 20 MG/G
2 OINTMENT TOPICAL
Qty: 1 | Refills: 2 | Status: ACTIVE | COMMUNITY
Start: 2024-09-16 | End: 1900-01-01

## 2024-11-12 ENCOUNTER — APPOINTMENT (OUTPATIENT)
Dept: OTOLARYNGOLOGY | Facility: CLINIC | Age: 49
End: 2024-11-12
Payer: MEDICAID

## 2024-11-12 VITALS
OXYGEN SATURATION: 99 % | BODY MASS INDEX: 24.11 KG/M2 | SYSTOLIC BLOOD PRESSURE: 122 MMHG | DIASTOLIC BLOOD PRESSURE: 84 MMHG | HEART RATE: 99 BPM | WEIGHT: 150 LBS | HEIGHT: 66 IN

## 2024-11-12 DIAGNOSIS — J31.0 CHRONIC RHINITIS: ICD-10-CM

## 2024-11-12 DIAGNOSIS — R51.9 HEADACHE, UNSPECIFIED: ICD-10-CM

## 2024-11-12 DIAGNOSIS — Q30.8 OTHER CONGENITAL MALFORMATIONS OF NOSE: ICD-10-CM

## 2024-11-12 DIAGNOSIS — J34.89 OTHER SPECIFIED DISORDERS OF NOSE AND NASAL SINUSES: ICD-10-CM

## 2024-11-12 DIAGNOSIS — J34.3 HYPERTROPHY OF NASAL TURBINATES: ICD-10-CM

## 2024-11-12 DIAGNOSIS — J30.9 ALLERGIC RHINITIS, UNSPECIFIED: ICD-10-CM

## 2024-11-12 PROCEDURE — 99214 OFFICE O/P EST MOD 30 MIN: CPT

## 2025-05-27 ENCOUNTER — APPOINTMENT (OUTPATIENT)
Dept: OTOLARYNGOLOGY | Facility: CLINIC | Age: 50
End: 2025-05-27

## 2025-07-07 ENCOUNTER — APPOINTMENT (OUTPATIENT)
Dept: DERMATOLOGY | Facility: CLINIC | Age: 50
End: 2025-07-07
Payer: MEDICAID

## 2025-07-07 ENCOUNTER — NON-APPOINTMENT (OUTPATIENT)
Age: 50
End: 2025-07-07

## 2025-07-07 VITALS — WEIGHT: 140 LBS | BODY MASS INDEX: 22.6 KG/M2

## 2025-07-07 PROCEDURE — 99214 OFFICE O/P EST MOD 30 MIN: CPT

## 2025-07-07 RX ORDER — HALOBETASOL PROPIONATE 0.5 MG/G
0.05 OINTMENT TOPICAL
Qty: 1 | Refills: 0 | Status: ACTIVE | COMMUNITY
Start: 2025-07-07 | End: 1900-01-01